# Patient Record
Sex: MALE | Race: WHITE | NOT HISPANIC OR LATINO | URBAN - METROPOLITAN AREA
[De-identification: names, ages, dates, MRNs, and addresses within clinical notes are randomized per-mention and may not be internally consistent; named-entity substitution may affect disease eponyms.]

---

## 2018-01-25 ENCOUNTER — INPATIENT (INPATIENT)
Facility: HOSPITAL | Age: 38
LOS: 1 days | Discharge: HOME | End: 2018-01-27
Attending: INTERNAL MEDICINE | Admitting: INTERNAL MEDICINE
Payer: COMMERCIAL

## 2018-01-25 VITALS
SYSTOLIC BLOOD PRESSURE: 140 MMHG | HEART RATE: 74 BPM | DIASTOLIC BLOOD PRESSURE: 88 MMHG | RESPIRATION RATE: 16 BRPM | OXYGEN SATURATION: 100 % | TEMPERATURE: 98 F

## 2018-01-25 DIAGNOSIS — R07.9 CHEST PAIN, UNSPECIFIED: ICD-10-CM

## 2018-01-25 LAB
ALBUMIN SERPL ELPH-MCNC: 5 G/DL — SIGNIFICANT CHANGE UP (ref 3.3–5)
ALP SERPL-CCNC: 61 U/L — SIGNIFICANT CHANGE UP (ref 40–120)
ALT FLD-CCNC: 64 U/L — HIGH (ref 4–41)
APTT BLD: 32.5 SEC — SIGNIFICANT CHANGE UP (ref 27.5–37.4)
AST SERPL-CCNC: 30 U/L — SIGNIFICANT CHANGE UP (ref 4–40)
BASOPHILS # BLD AUTO: 0.05 K/UL — SIGNIFICANT CHANGE UP (ref 0–0.2)
BASOPHILS NFR BLD AUTO: 0.9 % — SIGNIFICANT CHANGE UP (ref 0–2)
BILIRUB SERPL-MCNC: 0.7 MG/DL — SIGNIFICANT CHANGE UP (ref 0.2–1.2)
BLD GP AB SCN SERPL QL: NEGATIVE — SIGNIFICANT CHANGE UP
BUN SERPL-MCNC: 14 MG/DL — SIGNIFICANT CHANGE UP (ref 7–23)
CALCIUM SERPL-MCNC: 9.5 MG/DL — SIGNIFICANT CHANGE UP (ref 8.4–10.5)
CHLORIDE SERPL-SCNC: 96 MMOL/L — LOW (ref 98–107)
CK MB BLD-MCNC: 0.5 — SIGNIFICANT CHANGE UP (ref 0–2.5)
CK MB BLD-MCNC: 1 NG/ML — SIGNIFICANT CHANGE UP (ref 1–6.6)
CK SERPL-CCNC: 198 U/L — SIGNIFICANT CHANGE UP (ref 30–200)
CO2 SERPL-SCNC: 23 MMOL/L — SIGNIFICANT CHANGE UP (ref 22–31)
CREAT SERPL-MCNC: 0.93 MG/DL — SIGNIFICANT CHANGE UP (ref 0.5–1.3)
EOSINOPHIL # BLD AUTO: 0.08 K/UL — SIGNIFICANT CHANGE UP (ref 0–0.5)
EOSINOPHIL NFR BLD AUTO: 1.4 % — SIGNIFICANT CHANGE UP (ref 0–6)
GLUCOSE SERPL-MCNC: 91 MG/DL — SIGNIFICANT CHANGE UP (ref 70–99)
HCT VFR BLD CALC: 46 % — SIGNIFICANT CHANGE UP (ref 39–50)
HGB BLD-MCNC: 15.4 G/DL — SIGNIFICANT CHANGE UP (ref 13–17)
IMM GRANULOCYTES # BLD AUTO: 0.03 # — SIGNIFICANT CHANGE UP
IMM GRANULOCYTES NFR BLD AUTO: 0.5 % — SIGNIFICANT CHANGE UP (ref 0–1.5)
INR BLD: 1.01 — SIGNIFICANT CHANGE UP (ref 0.88–1.17)
LYMPHOCYTES # BLD AUTO: 1.12 K/UL — SIGNIFICANT CHANGE UP (ref 1–3.3)
LYMPHOCYTES # BLD AUTO: 19.5 % — SIGNIFICANT CHANGE UP (ref 13–44)
MCHC RBC-ENTMCNC: 30.9 PG — SIGNIFICANT CHANGE UP (ref 27–34)
MCHC RBC-ENTMCNC: 33.5 % — SIGNIFICANT CHANGE UP (ref 32–36)
MCV RBC AUTO: 92.2 FL — SIGNIFICANT CHANGE UP (ref 80–100)
MONOCYTES # BLD AUTO: 0.66 K/UL — SIGNIFICANT CHANGE UP (ref 0–0.9)
MONOCYTES NFR BLD AUTO: 11.5 % — SIGNIFICANT CHANGE UP (ref 2–14)
NEUTROPHILS # BLD AUTO: 3.8 K/UL — SIGNIFICANT CHANGE UP (ref 1.8–7.4)
NEUTROPHILS NFR BLD AUTO: 66.2 % — SIGNIFICANT CHANGE UP (ref 43–77)
NRBC # FLD: 0 — SIGNIFICANT CHANGE UP
PLATELET # BLD AUTO: 161 K/UL — SIGNIFICANT CHANGE UP (ref 150–400)
PMV BLD: 10 FL — SIGNIFICANT CHANGE UP (ref 7–13)
POTASSIUM SERPL-MCNC: 4.2 MMOL/L — SIGNIFICANT CHANGE UP (ref 3.5–5.3)
POTASSIUM SERPL-SCNC: 4.2 MMOL/L — SIGNIFICANT CHANGE UP (ref 3.5–5.3)
PROT SERPL-MCNC: 7.8 G/DL — SIGNIFICANT CHANGE UP (ref 6–8.3)
PROTHROM AB SERPL-ACNC: 11.2 SEC — SIGNIFICANT CHANGE UP (ref 9.8–13.1)
RBC # BLD: 4.99 M/UL — SIGNIFICANT CHANGE UP (ref 4.2–5.8)
RBC # FLD: 14.1 % — SIGNIFICANT CHANGE UP (ref 10.3–14.5)
RH IG SCN BLD-IMP: NEGATIVE — SIGNIFICANT CHANGE UP
SODIUM SERPL-SCNC: 137 MMOL/L — SIGNIFICANT CHANGE UP (ref 135–145)
TROPONIN T SERPL-MCNC: < 0.06 NG/ML — SIGNIFICANT CHANGE UP (ref 0–0.06)
WBC # BLD: 5.74 K/UL — SIGNIFICANT CHANGE UP (ref 3.8–10.5)
WBC # FLD AUTO: 5.74 K/UL — SIGNIFICANT CHANGE UP (ref 3.8–10.5)

## 2018-01-25 PROCEDURE — 71046 X-RAY EXAM CHEST 2 VIEWS: CPT | Mod: 26

## 2018-01-25 RX ORDER — INFLUENZA VIRUS VACCINE 15; 15; 15; 15 UG/.5ML; UG/.5ML; UG/.5ML; UG/.5ML
0.5 SUSPENSION INTRAMUSCULAR ONCE
Qty: 0 | Refills: 0 | Status: COMPLETED | OUTPATIENT
Start: 2018-01-25 | End: 2018-01-25

## 2018-01-25 RX ADMIN — Medication 100 MILLIGRAM(S): at 17:48

## 2018-01-25 NOTE — ED PROVIDER NOTE - ATTENDING CONTRIBUTION TO CARE
DR. LANG, ATTENDING MD-  I performed a face to face bedside interview with patient regarding history of present illness, review of symptoms and past medical history. I completed an independent physical exam.  I have discussed patient's plan of care with the resident.   Documentation as above in the note.    36 y/o male h/o etoh abuse, smoker (2 packs daily over 20 years), pos fam h/o early cardiac disease in father here with cp, acute onset yest, occasional rad to rue, no prior h/o such pain, resolved at present, sent by cards for urgent cath.  Last etoh was yesterday, has had w/d in past, feels "fine" at present.  Received asa today.  Denies f/c, ha, neck stiffness, sob, cough, abd pain, n/v/d, dysuria, rash.  Afebrile, vs wnl, nad, ctabil, s1s2 rrr no m/r/g, abd soft non ttp no r/g, no cva tenderness b/l, no leg swelling b/l, no rash.  ACS vs msk strain vs costochondritis, doubt pna as no cough/fever, doubt ptx as equal bs b/l, doubt pe as low risk by wells and perc out.  Obtain cbc, bmp, ce's, cxr, admit for further w/u.

## 2018-01-25 NOTE — ED PROVIDER NOTE - MEDICAL DECISION MAKING DETAILS
37yoM pw chest pain. abnormal ekg at cardiology clinic, t wave incersion in III and aVF. now ekg normal. will admit to cardiology for repeat trops and posible cath in the morning.

## 2018-01-25 NOTE — PATIENT PROFILE ADULT. - VISION (WITH CORRECTIVE LENSES IF THE PATIENT USUALLY WEARS THEM):
Poor vision to Rt eye./Partially impaired: cannot see medication labels or newsprint, but can see obstacles in path, and the surrounding layout; can count fingers at arm's length

## 2018-01-25 NOTE — ED ADULT NURSE NOTE - OBJECTIVE STATEMENT
received to room 29.  was sent in by cardiologist after went to urgent care for chest pain.  had severe chest pain yesterday, unlike any other pain in the past. today still has some mild pressure. denies any sob, n/v/d, abd pain, dizziness, weakness. appears comfortable at present. NSR on monitor. labs sent. 20 g iv placed in right ac. pt tremulous. states drinks daily. denies any other symptoms.

## 2018-01-25 NOTE — ED PROVIDER NOTE - OBJECTIVE STATEMENT
37yoM hx of Etoh abuse, + smoker, + family hx of ACS (father around 48yr), pw chest pain starting yesterday. described as left and right sided chest pain, with some mild radiation to right arm, worse with rest. was drinking yesterday, 37yoM hx of Etoh abuse, + smoker, + family hx of ACS (father around 48yr), pw chest pain starting yesterday. described as left and right sided chest pain, with some mild radiation to right arm, worse with rest, came on suddenly yesterday at rest. better today. seen yesterday by cardiologist who sent to ER for Dr. Meehan for cath. now chest pain improved, given 325mg aspirin by EMS. denies fevers, chills,nausea, vomiting, diarrhea abd pain, shortness of breath, weakness, numbness, tingling, or other issues.

## 2018-01-25 NOTE — CONSULT NOTE ADULT - ASSESSMENT
37 year old man with active tobacco and etoh use presenting for outside cardio office with active chest pain        1. Recurrent Chest Pain  multiple CAD risk factors  borderline ECG abnl  admit to tele for jung  pt with mostly constant pain for > 24 hours  asa  check flp, check echo   checl d-dimer    2. Etoh abuse  check labs, electrolytes  hydrate  ppi  patient with recurrent ongoing pain with multiple risk factors  sx not similar to typical gerd/dyspepsia sx  will plan for cath for definitive coronary assessment   patient in agreement   r/b/a discussed with pt    dvt ppx

## 2018-01-25 NOTE — CONSULT NOTE ADULT - SUBJECTIVE AND OBJECTIVE BOX
CARDIOLOGY CONSULT NOTE - DR. RONQUILLO    CHIEF COMPLAINT/REASON FOR CONSULT:    HPI:    Patient is a 37 year old man with active tobacco and etoh use presenting for outside cardio office with active chest pain.  Symptoms started last week and have increased in intensity since then .  Went to urgent care and then [facility] and sent in for further eval     now with some chest pain, improved but still 3/10  no exertional component  Patient denies any dyspnea, palpitations, cough, syncope, edema, exertional symptoms, nausea, abdominal pain, fever, chills,  or rash.  Denies any history of CAD, MI, valve disease, cardiomyopathy, or congenital heart disease.  father with cad,cabg in 50's    + etoh use daily  + tobacco use    PAST MEDICAL & SURGICAL HISTORY:      PREVIOUS DIAGNOSTIC TESTING:    [ ] Echocardiogram:  [ ]  Catheterization:  [ ] Stress Test:  	    MEDICATIONS:  MEDICATIONS  (STANDING):      FAMILY HISTORY:      SOCIAL HISTORY:    [ ] Non-smoker  [x ] Smoker  x[ ] Alcohol    Allergies    No Known Allergies    Intolerances    	    REVIEW OF SYSTEMS:  CONSTITUTIONAL: No fever, weight loss, or fatigue  EYES: No eye pain, visual disturbances, or discharge  ENMT:  No difficulty hearing, tinnitus, vertigo; No sinus or throat pain  NECK: No pain or stiffness  RESPIRATORY: No cough, wheezing, chills or hemoptysis; No Shortness of Breath  CARDIOVASCULAR: No chest pain, palpitations, passing out, dizziness, or leg swelling  GASTROINTESTINAL: No abdominal or epigastric pain. No nausea, vomiting, or hematemesis; No diarrhea or constipation. No melena or hematochezia.  GENITOURINARY: No dysuria, frequency, hematuria, or incontinence  NEUROLOGICAL: No headaches, memory loss, loss of strength, numbness, or tremors  SKIN: No itching, burning, rashes, or lesions   	    [ ] All others negative	  [ ] Unable to obtain    PHYSICAL EXAM:  T(C): 36.8 (01-25-18 @ 15:52), Max: 36.8 (01-25-18 @ 15:52)  HR: 74 (01-25-18 @ 15:52) (74 - 74)  BP: 140/88 (01-25-18 @ 15:52) (140/88 - 140/88)  RR: 16 (01-25-18 @ 15:52) (16 - 16)  SpO2: 100% (01-25-18 @ 15:52) (100% - 100%)  Wt(kg): --  I&O's Summary      Appearance: Normal	  Psychiatry: A & O x 3, Mood & affect appropriate  HEENT:   Normal oral mucosa, PERRL, EOMI	  Lymphatic: No lymphadenopathy  Cardiovascular: Normal S1 S2,RRR, No JVD, No murmurs  Respiratory: Lungs clear to auscultation	  Gastrointestinal:  Soft, Non-tender, + BS	  Skin: No rashes, No ecchymoses, No cyanosis	  Neurologic: Non-focal  Extremities: Normal range of motion, No clubbing, cyanosis or edema  Vascular: Peripheral pulses palpable 2+ bilaterally    TELEMETRY: 	    ECG:  	sr, nonspecific st abnl  RADIOLOGY:  OTHER: 	  	  LABS:	 	    CARDIAC MARKERS:                      proBNP:   Lipid Profile:   HgA1c:   TSH:     ASSESSMENT/PLAN:

## 2018-01-25 NOTE — ED ADULT TRIAGE NOTE - CHIEF COMPLAINT QUOTE
Pt with complaints of chest pain  generalized, all day yesterday, " better today" went to urgi care who referred to cardiologist saw dr Pederson, dr Pederson referred pt to ED for further work up possible cath, denies PMH denies medication  admits to half "big bottle of vodka" daily last drink yesterday 7 pm, admits to withdrawal symptoms in past never been hospitalized in past for them, received asa 324 via EMS

## 2018-01-26 LAB
AMYLASE P1 CFR SERPL: 28 U/L — SIGNIFICANT CHANGE UP (ref 25–125)
BUN SERPL-MCNC: 19 MG/DL — SIGNIFICANT CHANGE UP (ref 7–23)
CALCIUM SERPL-MCNC: 9.1 MG/DL — SIGNIFICANT CHANGE UP (ref 8.4–10.5)
CHLORIDE SERPL-SCNC: 98 MMOL/L — SIGNIFICANT CHANGE UP (ref 98–107)
CO2 SERPL-SCNC: 24 MMOL/L — SIGNIFICANT CHANGE UP (ref 22–31)
CREAT SERPL-MCNC: 1.11 MG/DL — SIGNIFICANT CHANGE UP (ref 0.5–1.3)
GLUCOSE SERPL-MCNC: 109 MG/DL — HIGH (ref 70–99)
LIDOCAIN IGE QN: 25.9 U/L — SIGNIFICANT CHANGE UP (ref 7–60)
MAGNESIUM SERPL-MCNC: 2.3 MG/DL — SIGNIFICANT CHANGE UP (ref 1.6–2.6)
PHOSPHATE SERPL-MCNC: 3.7 MG/DL — SIGNIFICANT CHANGE UP (ref 2.5–4.5)
POTASSIUM SERPL-MCNC: 4.2 MMOL/L — SIGNIFICANT CHANGE UP (ref 3.5–5.3)
POTASSIUM SERPL-SCNC: 4.2 MMOL/L — SIGNIFICANT CHANGE UP (ref 3.5–5.3)
SODIUM SERPL-SCNC: 140 MMOL/L — SIGNIFICANT CHANGE UP (ref 135–145)

## 2018-01-26 PROCEDURE — 93306 TTE W/DOPPLER COMPLETE: CPT | Mod: 26

## 2018-01-26 RX ORDER — ASPIRIN/CALCIUM CARB/MAGNESIUM 324 MG
81 TABLET ORAL DAILY
Qty: 0 | Refills: 0 | Status: DISCONTINUED | OUTPATIENT
Start: 2018-01-26 | End: 2018-01-27

## 2018-01-26 RX ORDER — SODIUM CHLORIDE 9 MG/ML
500 INJECTION INTRAMUSCULAR; INTRAVENOUS; SUBCUTANEOUS
Qty: 0 | Refills: 0 | Status: DISCONTINUED | OUTPATIENT
Start: 2018-01-26 | End: 2018-01-27

## 2018-01-26 RX ORDER — SODIUM CHLORIDE 9 MG/ML
3 INJECTION INTRAMUSCULAR; INTRAVENOUS; SUBCUTANEOUS EVERY 8 HOURS
Qty: 0 | Refills: 0 | Status: DISCONTINUED | OUTPATIENT
Start: 2018-01-26 | End: 2018-01-27

## 2018-01-26 RX ORDER — PANTOPRAZOLE SODIUM 20 MG/1
40 TABLET, DELAYED RELEASE ORAL
Qty: 0 | Refills: 0 | Status: DISCONTINUED | OUTPATIENT
Start: 2018-01-26 | End: 2018-01-27

## 2018-01-26 RX ADMIN — SODIUM CHLORIDE 3 MILLILITER(S): 9 INJECTION INTRAMUSCULAR; INTRAVENOUS; SUBCUTANEOUS at 20:41

## 2018-01-26 RX ADMIN — SODIUM CHLORIDE 3 MILLILITER(S): 9 INJECTION INTRAMUSCULAR; INTRAVENOUS; SUBCUTANEOUS at 12:56

## 2018-01-26 RX ADMIN — SODIUM CHLORIDE 100 MILLILITER(S): 9 INJECTION INTRAMUSCULAR; INTRAVENOUS; SUBCUTANEOUS at 12:57

## 2018-01-26 RX ADMIN — Medication 81 MILLIGRAM(S): at 12:56

## 2018-01-26 NOTE — H&P CARDIOLOGY - HISTORY OF PRESENT ILLNESS
36 yo M with PMH/not significant, PSH/ 2 PPD year smoker, ETOH abuser (1/2 bottle of Vodka a day), PFH/ father-- cardiac dz, who presented to the urgent care with chest pain, and was sent to Dr Pederson to the [facility]. Pt c/o chest pain x 2 day, which he describes as burning, intermittent, lasting x few minutes, and resolving on its own. Pt states, that pain started at the time he was drinking. No exacerbating or alleviating factors. No prior episodes. Pt denies any cough/F/C/other complaints. 36 yo M with PMH/not significant, PSH/ 2 PPD year smoker, ETOH abuser (1/2 bottle of Vodka a day), PFH/ father-- cardiac dz, who presented to the urgent care with chest pain, and was sent to Dr Pederson's office, who sent pt to the [facility] to R/O ACS. Pt c/o chest pain x 2 day, which he describes as burning, intermittent, lasting x few minutes, and resolving on its own. Pt states, that pain started at the time he was drinking. No exacerbating or alleviating factors. No prior episodes. Pt denies any cough/F/C/other complaints. 38 yo M with PMH/not significant, PSH/ 2 PPD year smoker, ETOH abuser (1/2 bottle of Vodka a day), PFH/ father-- cardiac dz, who presented to the urgent care with chest pain, and was sent to Dr Pederson's office, who sent pt to the [facility] to R/O ACS. Pt c/o chest pain x 2 day, which he describes as burning, intermittent, lasting x few minutes, and resolving on its own. Pt states, that pain started at the time he was drinking. No exacerbating or alleviating factors. No prior episodes. Pt denies any cough/F/C/other complaints.     cardiology-- Dr Pederson, 1 st visit

## 2018-01-26 NOTE — CONSULT NOTE ADULT - SUBJECTIVE AND OBJECTIVE BOX
HPI: 36 yo M with PMH/not significant, PSH/ 2 PPD year smoker, ETOH abuser (1/2 bottle of Vodka a day), PFH/ father-- cardiac dz, who presented to the urgent care with chest pain, and was sent to Dr Pederson to the [facility]. Pt c/o chest pain x 2 day, which he describes as burning, intermittent, lasting x few minutes, and resolving on its own. Pt states, that pain started at the time he was drinking. No exacerbating or alleviating factors. No prior episodes. Pt denies any cough/F/C/other complaints.       Allergies:  No Known Allergies      PAST MEDICAL & SURGICAL HISTORY:  ETOH abuse  No significant past surgical history      FAMILY HISTORY:  Family history of early CAD (Father): CABG-- father      REVIEW OF SYSTEMS:  CONSTITUTIONAL: No fever, weight loss, or fatigue  EYES: No eye pain, visual disturbances, or discharge  NECK: No pain or stiffness  RESPIRATORY: No cough or wheezing, no shortness of breath  CARDIOVASCULAR: + chest pain, no palpitations, dizziness, or leg swelling  GASTROINTESTINAL: No abdominal or epigastric pain. No nausea, vomiting, diarrhea or constipation  GENITOURINARY: No dysuria, urinary frequency or urgency, no hematuria  NEUROLOGICAL: No headaches, memory loss, loss of strength, numbness, or tremors  SKIN: No itching, burning, rashes, or lesions   MUSCULOSKELETAL: No joint pain or swelling; No muscle, back, or extremity pain    Medications:  MEDICATIONS  (STANDING):  aspirin enteric coated 81 milliGRAM(s) Oral daily  influenza   Vaccine 0.5 milliLiter(s) IntraMuscular once  pantoprazole    Tablet 40 milliGRAM(s) Oral before breakfast  sodium chloride 0.9% lock flush 3 milliLiter(s) IV Push every 8 hours  sodium chloride 0.9%. 500 milliLiter(s) (100 mL/Hr) IV Continuous <Continuous>    MEDICATIONS  (PRN):    	    PHYSICAL EXAM:  T(C): 36.7 (01-26-18 @ 14:21), Max: 36.8 (01-25-18 @ 15:52)  HR: 60 (01-26-18 @ 14:21) (60 - 75)  BP: 115/77 (01-26-18 @ 14:21) (115/77 - 140/88)  RR: 16 (01-26-18 @ 14:21) (13 - 18)  SpO2: 100% (01-26-18 @ 14:21) (98% - 100%)  Wt(kg): --  I&O's Summary      Appearance: Normal	  HEENT:   NCAT, PERRL, EOMI	  Lymphatic: No lymphadenopathy  Cardiovascular: Normal S1 S2, RRR  Respiratory: Lungs clear to auscultation BL  Psychiatry: A & O x 3, Mood & affect appropriate  Gastrointestinal:  Soft, very mild epigastric tenderness, + BS  Skin: No rashes, No ecchymoses, No cyanosis	  Neurologic: Non-focal  Extremities: Normal range of motion, No clubbing, cyanosis or edema    	  LABS:	 	    CARDIAC MARKERS:  CARDIAC MARKERS ( 25 Jan 2018 17:30 )  x     / < 0.06 ng/mL / 198 u/L / 1.00 ng/mL / x                                    15.4   5.74  )-----------( 161      ( 25 Jan 2018 17:30 )             46.0     01-25    137  |  96<L>  |  14  ----------------------------<  91  4.2   |  23  |  0.93    Ca    9.5      25 Jan 2018 17:30    TPro  7.8  /  Alb  5.0  /  TBili  0.7  /  DBili  x   /  AST  30  /  ALT  64<H>  /  AlkPhos  61  01-25    proBNP:   Lipid Profile:   HgA1c:   TSH:

## 2018-01-26 NOTE — CONSULT NOTE ADULT - ASSESSMENT
38 yo M w/EtOH abuse, chest pain:  1. Chest pain - ACS ruled out, cath nonobstructive, musculoskeletal vs GERD vs ? pancreatitis, although later less likely. C/w PPI, IVF, check amylase/lipase, other work up per Cardio, GI eval outpt  2. EtOH abuse - counselling on EtOH cessation provided, pt denies any withdrawal in the past, monitor CIWA while admitted, Ativan PRN for increase in CIWA, not interested in quitting at this time  3. DVT prophylaxis

## 2018-01-26 NOTE — H&P CARDIOLOGY - COMMENTS
36 yo M with PMH/not significant, PSH/ 2 PPD year smoker, ETOH abuser (1/2 bottle of Vodka a day), PFH/ father-- cardiac dz, who presented to the urgent care with chest pain, and was sent to Dr Pederson's office, who sent pt to the [facility] to R/O ACS. Pt c/o chest pain x 2 day, which he describes as burning, intermittent, lasting x few minutes, and resolving on its own. Pt states, that pain started at the time he was drinking. No exacerbating or alleviating factors. No prior episodes. Pt denies any cough/F/C/other complaints.    Problem # 1.   Chest pain - ACS ruled out, cath nonobstructive disease, ECHO-- follow up results;   >> R/o pancreatitis, C/w PPI, IVF, check amylase/lipase,   >> cardiology follow up. GI eval outpt    Problem 2.  >> EtOH abuse - counselling on EtOH cessation provided, pt denies any withdrawal in the past,   >>monitor CIWA while admitted, Ativan PRN if scores on CIWA, not interested in quitting at this time    Problem 3, Active smoker:   -- counseling on quitting provided, pt is not interested in quitting at the present time,

## 2018-01-26 NOTE — H&P CARDIOLOGY - RS GEN PE MLT RESP DETAILS PC
clear to auscultation bilaterally/no intercostal retractions/no rales/no wheezes/normal/airway patent/no chest wall tenderness/no rhonchi/no subcutaneous emphysema/breath sounds equal/respirations non-labored

## 2018-01-26 NOTE — H&P CARDIOLOGY - NEGATIVE CARDIOVASCULAR SYMPTOMS
no orthopnea/no paroxysmal nocturnal dyspnea/no dyspnea on exertion/no palpitations/no peripheral edema/no claudication

## 2018-01-27 VITALS
OXYGEN SATURATION: 100 % | HEART RATE: 74 BPM | TEMPERATURE: 97 F | DIASTOLIC BLOOD PRESSURE: 80 MMHG | RESPIRATION RATE: 18 BRPM | SYSTOLIC BLOOD PRESSURE: 124 MMHG

## 2018-01-27 LAB
BUN SERPL-MCNC: 14 MG/DL — SIGNIFICANT CHANGE UP (ref 7–23)
CALCIUM SERPL-MCNC: 9 MG/DL — SIGNIFICANT CHANGE UP (ref 8.4–10.5)
CHLORIDE SERPL-SCNC: 99 MMOL/L — SIGNIFICANT CHANGE UP (ref 98–107)
CO2 SERPL-SCNC: 25 MMOL/L — SIGNIFICANT CHANGE UP (ref 22–31)
CREAT SERPL-MCNC: 1.01 MG/DL — SIGNIFICANT CHANGE UP (ref 0.5–1.3)
GLUCOSE SERPL-MCNC: 96 MG/DL — SIGNIFICANT CHANGE UP (ref 70–99)
HCT VFR BLD CALC: 47.9 % — SIGNIFICANT CHANGE UP (ref 39–50)
HGB BLD-MCNC: 15.7 G/DL — SIGNIFICANT CHANGE UP (ref 13–17)
MCHC RBC-ENTMCNC: 31.2 PG — SIGNIFICANT CHANGE UP (ref 27–34)
MCHC RBC-ENTMCNC: 32.8 % — SIGNIFICANT CHANGE UP (ref 32–36)
MCV RBC AUTO: 95 FL — SIGNIFICANT CHANGE UP (ref 80–100)
NRBC # FLD: 0 — SIGNIFICANT CHANGE UP
PLATELET # BLD AUTO: 144 K/UL — LOW (ref 150–400)
PMV BLD: 10.3 FL — SIGNIFICANT CHANGE UP (ref 7–13)
POTASSIUM SERPL-MCNC: 4.2 MMOL/L — SIGNIFICANT CHANGE UP (ref 3.5–5.3)
POTASSIUM SERPL-SCNC: 4.2 MMOL/L — SIGNIFICANT CHANGE UP (ref 3.5–5.3)
RBC # BLD: 5.04 M/UL — SIGNIFICANT CHANGE UP (ref 4.2–5.8)
RBC # FLD: 14 % — SIGNIFICANT CHANGE UP (ref 10.3–14.5)
SODIUM SERPL-SCNC: 138 MMOL/L — SIGNIFICANT CHANGE UP (ref 135–145)
WBC # BLD: 4.94 K/UL — SIGNIFICANT CHANGE UP (ref 3.8–10.5)
WBC # FLD AUTO: 4.94 K/UL — SIGNIFICANT CHANGE UP (ref 3.8–10.5)

## 2018-01-27 PROCEDURE — 71275 CT ANGIOGRAPHY CHEST: CPT | Mod: 26

## 2018-01-27 RX ORDER — PANTOPRAZOLE SODIUM 20 MG/1
1 TABLET, DELAYED RELEASE ORAL
Qty: 0 | Refills: 0 | COMMUNITY
Start: 2018-01-27

## 2018-01-27 RX ORDER — ASPIRIN/CALCIUM CARB/MAGNESIUM 324 MG
1 TABLET ORAL
Qty: 0 | Refills: 0 | COMMUNITY
Start: 2018-01-27

## 2018-01-27 RX ADMIN — SODIUM CHLORIDE 3 MILLILITER(S): 9 INJECTION INTRAMUSCULAR; INTRAVENOUS; SUBCUTANEOUS at 05:37

## 2018-01-27 RX ADMIN — Medication 81 MILLIGRAM(S): at 13:17

## 2018-01-27 RX ADMIN — PANTOPRAZOLE SODIUM 40 MILLIGRAM(S): 20 TABLET, DELAYED RELEASE ORAL at 05:38

## 2018-01-27 RX ADMIN — SODIUM CHLORIDE 3 MILLILITER(S): 9 INJECTION INTRAMUSCULAR; INTRAVENOUS; SUBCUTANEOUS at 13:17

## 2018-01-27 NOTE — PROGRESS NOTE ADULT - ASSESSMENT
36 yo M w/EtOH abuse, chest pain:  1. Chest pain - ACS ruled out, cath nonobstructive, musculoskeletal vs GERD, TTE normal, CTA today per Cardio, pancreatitis ruled out  2. EtOH abuse - counselling on EtOH cessation provided, pt denies any withdrawal in the past, monitor CIWA while admitted, Ativan PRN for increase in CIWA, not interested in quitting at this time  3. DVT prophylaxis
1. Chest Pain  2. ETOH Abuse  3. + FH of CAD    -CV stable  -cath revealed normal coronaries  -ECHO normal  -plan for CTA today, if negative can DC home to follow up with Dr. Pederson next week

## 2018-01-27 NOTE — DISCHARGE NOTE ADULT - HOSPITAL COURSE
36 y/o male with h/o alcohol abuse, smoker (2 packs daily over 20 years), pos family h/o early cardiac disease in father, presented to the ED from Dr. Pederson's office with chest pain, admitted to [facility] for rule out ACS. EKG showed NSR at 73 bpm, TWI in leads III, AVF, hyperacute T waves in V2-V4. Cardiac enzymes were negative. CXR showed clear lungs. Patient had a cardiac catheterization which revealed normal coronary arteries, right wrist stable post procedure. Echo showed grossly low normal LVSF, normal pericardium with no pericardial effusion. CTPA reports hepatic steatosis, but no evidence of PE. ASA 81mg was started for primary prevention. Patient was started on protonix as symptoms may be attributed to GERD. Pancreatitis ruled out. Patient was placed on CIWA protocol while admitted for h/o alcohol abuse. Counseling on ETOH cessation was provided and patient not interested in quitting at this time.    1/27/18 Patient is stable and ready for discharge home as per Dr. Meehan.

## 2018-01-27 NOTE — DISCHARGE NOTE ADULT - MEDICATION SUMMARY - MEDICATIONS TO TAKE
I will START or STAY ON the medications listed below when I get home from the hospital:    aspirin 81 mg oral delayed release tablet  -- 1 tab(s) by mouth once a day  -- Indication: For Chest pain    pantoprazole 40 mg oral delayed release tablet  -- 1 tab(s) by mouth once a day (before a meal)  -- Indication: For GERD prophylaxis

## 2018-01-27 NOTE — PROGRESS NOTE ADULT - SUBJECTIVE AND OBJECTIVE BOX
CC: no new complaints, s/p cath revealing normal coronary arteries    TELEMETRY:     PHYSICAL EXAM:    T(C): 36.8 (01-27-18 @ 05:37), Max: 36.8 (01-26-18 @ 20:41)  HR: 67 (01-27-18 @ 05:37) (60 - 89)  BP: 115/63 (01-27-18 @ 05:37) (115/63 - 129/79)  RR: 18 (01-27-18 @ 05:37) (16 - 18)  SpO2: 100% (01-27-18 @ 05:37) (100% - 100%)  Wt(kg): --  I&O's Summary    26 Jan 2018 07:01  -  27 Jan 2018 07:00  --------------------------------------------------------  IN: 500 mL / OUT: 0 mL / NET: 500 mL        Appearance: Normal	  Cardiovascular: Normal S1 S2,RRR, No JVD, No murmurs  Respiratory: Lungs clear to auscultation	  Gastrointestinal:  Soft, Non-tender, + BS	  Extremities: Normal range of motion, No clubbing, cyanosis or edema  Vascular: Peripheral pulses palpable 2+ bilaterally     LABS:	 	                          15.7   4.94  )-----------( 144      ( 27 Jan 2018 06:03 )             47.9     01-27    138  |  99  |  14  ----------------------------<  96  4.2   |  25  |  1.01    Ca    9.0      27 Jan 2018 06:03  Phos  3.7     01-26  Mg     2.3     01-26    TPro  7.8  /  Alb  5.0  /  TBili  0.7  /  DBili  x   /  AST  30  /  ALT  64<H>  /  AlkPhos  61  01-25      PT/INR - ( 25 Jan 2018 17:30 )   PT: 11.2 SEC;   INR: 1.01          PTT - ( 25 Jan 2018 17:30 )  PTT:32.5 SEC    CARDIAC MARKERS:
Chief complaint: chest pain    Allergies:  No Known Allergies      PAST MEDICAL & SURGICAL HISTORY:  ETOH abuse  No significant past surgical history      FAMILY HISTORY:  Family history of early CAD (Father): CABG-- father      REVIEW OF SYSTEMS:  CONSTITUTIONAL: No fever, weight loss, or fatigue  EYES: No eye pain, visual disturbances, or discharge  NECK: No pain or stiffness  RESPIRATORY: No cough or wheezing, no shortness of breath  CARDIOVASCULAR: minimal chest pain, no palpitations, dizziness, or leg swelling  GASTROINTESTINAL: No abdominal or epigastric pain. No nausea, vomiting, diarrhea or constipation  GENITOURINARY: No dysuria, urinary frequency or urgency, no hematuria  NEUROLOGICAL: No headaches, memory loss, loss of strength, numbness, or tremors  SKIN: No itching, burning, rashes, or lesions   MUSCULOSKELETAL: No joint pain or swelling; No muscle, back, or extremity pain      Medications:  MEDICATIONS  (STANDING):  aspirin enteric coated 81 milliGRAM(s) Oral daily  influenza   Vaccine 0.5 milliLiter(s) IntraMuscular once  pantoprazole    Tablet 40 milliGRAM(s) Oral before breakfast  sodium chloride 0.9% lock flush 3 milliLiter(s) IV Push every 8 hours  sodium chloride 0.9%. 500 milliLiter(s) (100 mL/Hr) IV Continuous <Continuous>    MEDICATIONS  (PRN):    	    PHYSICAL EXAM:  T(C): 36.8 (01-27-18 @ 05:37), Max: 36.8 (01-26-18 @ 20:41)  HR: 67 (01-27-18 @ 05:37) (60 - 89)  BP: 115/63 (01-27-18 @ 05:37) (115/63 - 129/79)  RR: 18 (01-27-18 @ 05:37) (16 - 18)  SpO2: 100% (01-27-18 @ 05:37) (100% - 100%)  Wt(kg): --  I&O's Summary    26 Jan 2018 07:01  -  27 Jan 2018 07:00  --------------------------------------------------------  IN: 500 mL / OUT: 0 mL / NET: 500 mL    27 Jan 2018 07:01  -  27 Jan 2018 12:45  --------------------------------------------------------  IN: 120 mL / OUT: 0 mL / NET: 120 mL      Appearance: Normal	  HEENT:   NCAT, PERRL, EOMI	  Lymphatic: No lymphadenopathy  Cardiovascular: Normal S1 S2, RRR  Respiratory: Lungs clear to auscultation BL  Psychiatry: A & O x 3, Mood & affect appropriate  Gastrointestinal:  Soft, very mild epigastric tenderness, + BS  Skin: No rashes, No ecchymoses, No cyanosis	  Neurologic: Non-focal  Extremities: Normal range of motion, No clubbing, cyanosis or edema    LABS:	 	    CARDIAC MARKERS:  CARDIAC MARKERS ( 25 Jan 2018 17:30 )  x     / < 0.06 ng/mL / 198 u/L / 1.00 ng/mL / x                                    15.7   4.94  )-----------( 144      ( 27 Jan 2018 06:03 )             47.9     01-27    138  |  99  |  14  ----------------------------<  96  4.2   |  25  |  1.01    Ca    9.0      27 Jan 2018 06:03  Phos  3.7     01-26  Mg     2.3     01-26    TPro  7.8  /  Alb  5.0  /  TBili  0.7  /  DBili  x   /  AST  30  /  ALT  64<H>  /  AlkPhos  61  01-25    proBNP:   Lipid Profile:   HgA1c:   TSH:

## 2018-01-27 NOTE — DISCHARGE NOTE ADULT - PLAN OF CARE
Prevent future episodes Continue ASA.  Follow up with Dr. Pederson within 1 week of discharge. Call for appointment. Continue Protonix.   Follow up with primary care doctor within 1-2 weeks. Call for appointment. Abstain from alcohol abuse.  Follow up with your primary care doctor within 1-2 weeks. Continue ASA. Patient prefers to buy medications over the counter.  Follow up with Dr. Pederson within 1 week of discharge. Call for appointment.

## 2018-01-27 NOTE — DISCHARGE NOTE ADULT - PATIENT PORTAL LINK FT
“You can access the [facility] Patient Portal, offered by [facility], by registering with the following website: [email or web address]”

## 2018-01-27 NOTE — DISCHARGE NOTE ADULT - CARE PLAN
Principal Discharge DX:	Chest pain  Goal:	Prevent future episodes  Assessment and plan of treatment:	Continue ASA.  Follow up with Dr. Pederson within 1 week of discharge. Call for appointment.  Secondary Diagnosis:	GERD (gastroesophageal reflux disease)  Assessment and plan of treatment:	Continue Protonix.   Follow up with primary care doctor within 1-2 weeks. Call for appointment.  Secondary Diagnosis:	ETOH abuse  Assessment and plan of treatment:	Abstain from alcohol abuse.  Follow up with your primary care doctor within 1-2 weeks. Principal Discharge DX:	Chest pain  Goal:	Prevent future episodes  Assessment and plan of treatment:	Continue ASA. Patient prefers to buy medications over the counter.  Follow up with Dr. Pederson within 1 week of discharge. Call for appointment.  Secondary Diagnosis:	GERD (gastroesophageal reflux disease)  Assessment and plan of treatment:	Continue Protonix.   Follow up with primary care doctor within 1-2 weeks. Call for appointment.  Secondary Diagnosis:	ETOH abuse  Assessment and plan of treatment:	Abstain from alcohol abuse.  Follow up with your primary care doctor within 1-2 weeks.

## 2018-01-27 NOTE — DISCHARGE NOTE ADULT - VISION (WITH CORRECTIVE LENSES IF THE PATIENT USUALLY WEARS THEM):
Partially impaired: cannot see medication labels or newsprint, but can see obstacles in path, and the surrounding layout; can count fingers at arm's length/Poor vision to Rt eye.

## 2018-01-27 NOTE — DISCHARGE NOTE ADULT - ADDITIONAL INSTRUCTIONS
Follow up with Dr. Pederson within 1 week of discharge. Call for appointment.  Follow up with primary care doctor within 1-2 weeks. Call for appointment.

## 2018-01-27 NOTE — DISCHARGE NOTE ADULT - CARE PROVIDER_API CALL
Anderson Pederson (MD), Cardiovascular Disease  [address] [place] [address]  Phone: [phone]  Fax: [phone]

## 2020-03-03 NOTE — H&P CARDIOLOGY - EXTREMITIES
detailed exam I personally performed the service described in the documentation recorded by the scribe in my presence, and it accurately and completely records my words and actions.

## 2023-05-12 ENCOUNTER — OUTPATIENT (OUTPATIENT)
Dept: OUTPATIENT SERVICES | Facility: HOSPITAL | Age: 43
LOS: 1 days | Discharge: HOME | End: 2023-05-12
Payer: COMMERCIAL

## 2023-05-12 PROBLEM — F10.10 ALCOHOL ABUSE, UNCOMPLICATED: Chronic | Status: ACTIVE | Noted: 2018-01-25

## 2023-05-12 PROCEDURE — 99214 OFFICE O/P EST MOD 30 MIN: CPT | Mod: 95

## 2023-05-15 DIAGNOSIS — F10.20 ALCOHOL DEPENDENCE, UNCOMPLICATED: ICD-10-CM

## 2023-05-15 DIAGNOSIS — F41.9 ANXIETY DISORDER, UNSPECIFIED: ICD-10-CM

## 2023-05-30 PROCEDURE — 99212 OFFICE O/P EST SF 10 MIN: CPT | Mod: 95
